# Patient Record
Sex: FEMALE | Race: WHITE | URBAN - METROPOLITAN AREA
[De-identification: names, ages, dates, MRNs, and addresses within clinical notes are randomized per-mention and may not be internally consistent; named-entity substitution may affect disease eponyms.]

---

## 2024-01-28 ENCOUNTER — HOSPITAL ENCOUNTER (EMERGENCY)
Facility: HOSPITAL | Age: 39
Discharge: LEFT BEFORE DISCHARGE | End: 2024-01-28
Attending: EMERGENCY MEDICINE

## 2024-01-28 VITALS
SYSTOLIC BLOOD PRESSURE: 122 MMHG | WEIGHT: 140 LBS | TEMPERATURE: 98 F | HEIGHT: 66 IN | BODY MASS INDEX: 22.5 KG/M2 | HEART RATE: 84 BPM | DIASTOLIC BLOOD PRESSURE: 86 MMHG | RESPIRATION RATE: 17 BRPM | OXYGEN SATURATION: 96 %

## 2024-01-28 DIAGNOSIS — R51.9 NONINTRACTABLE HEADACHE, UNSPECIFIED CHRONICITY PATTERN, UNSPECIFIED HEADACHE TYPE: Primary | ICD-10-CM

## 2024-01-28 PROCEDURE — 99283 EMERGENCY DEPT VISIT LOW MDM: CPT

## 2024-01-28 PROCEDURE — 99284 EMERGENCY DEPT VISIT MOD MDM: CPT

## 2024-01-28 NOTE — ED QUICK NOTES
Pt states she is new to the area and has run out of her Fioricet. Pt does c/o headache but does not want a PIV or any medications for it here.

## 2024-01-28 NOTE — ED PROVIDER NOTES
Patient Seen in: [facility] Emergency Department      History     Chief Complaint   Patient presents with    Medication Administration     Stated Complaint: migraine today    Subjective:   HPI    38-year-old female comes to the hospital complaint of having difficulty headache.  She has been having a migraine age.  She says the symptoms are in her typical location and intensity.  She states that she came looking for Fioricet.  She just moved into the neighborhood and does not have a primary physician as of yet.  She denies any fevers or chills.  No neck pain or stiffness.  She denying any other complaints.    Objective:   Past Medical History:   Diagnosis Date    Migraines               History reviewed. No pertinent surgical history.             Social History     Socioeconomic History    Marital status:    Tobacco Use    Smoking status: Never    Smokeless tobacco: Never   Vaping Use    Vaping Use: Never used   Substance and Sexual Activity    Alcohol use: Never    Drug use: Never              Review of Systems    Positive for stated complaint: migraine today  Other systems are as noted in HPI.  Constitutional and vital signs reviewed.      All other systems reviewed and negative except as noted above.    Physical Exam     ED Triage Vitals [01/28/24 1446]   /86   Pulse 84   Resp 17   Temp 97.6 °F (36.4 °C)   Temp src Temporal   SpO2 96 %   O2 Device None (Room air)       Current:/86   Pulse 84   Temp 97.6 °F (36.4 °C) (Temporal)   Resp 17   Ht 167.6 cm (5' 6\")   Wt 63.5 kg   LMP 12/28/2023 (Approximate)   SpO2 96%   BMI 22.60 kg/m²         Physical Exam    HEENT; NCAT, EOMI, throat clear, neck supple, no LAD, no JVD  Heart S1S2 RRR  lungs: CTAB  abd: Soft NT, ND,  NABS without rebound or guarding  Ext no C/C/E    ED Course   Labs Reviewed - No data to display          While here I explained that the emergency room setting is the place where we rule out emergent conditions.  I stated  that I was happy to try to treat her headache and do a medical workup on her but that it would be inappropriate to just place the patient on Fioricet without any other workup or care.  Patient states that she is not interested in any other workup or care and just wants Fioricet.  I explained that I felt this was an inappropriate way to go about her process and at this time she is wants to leave without any other care.         MDM      Differential diagnosis does include migraine and tension headache but not limited such.  Patient is refusing any other management at this time and wants to go home.      This note was prepared using Dragon [employer] voice recognition dictation software.  As a result errors may occur.  When identified to these areas have been corrected.  While every attempt is made to correct errors during dictation discrepancies may still exist.  Please contact if there are any errors.                                   Medical Decision Making      Disposition and Plan     Clinical Impression:  1. Nonintractable headache, unspecified chronicity pattern, unspecified headache type         Disposition:  Discharge  1/28/2024  3:12 pm    Follow-up:  Micheal Gold MD  [facility]  [address] [id number] [phone]    Schedule an appointment as soon as possible for a visit in 2 day(s)            Medications Prescribed:  There are no discharge medications for this patient.

## 2024-01-28 NOTE — ED INITIAL ASSESSMENT (HPI)
Headache since morning nauseated history of migraine before . Took ibuprofen and tylenol  for pain . Did not help  for pain